# Patient Record
Sex: MALE | Race: WHITE | NOT HISPANIC OR LATINO | URBAN - METROPOLITAN AREA
[De-identification: names, ages, dates, MRNs, and addresses within clinical notes are randomized per-mention and may not be internally consistent; named-entity substitution may affect disease eponyms.]

---

## 2017-08-28 ENCOUNTER — OFFICE VISIT - HEALTHEAST (OUTPATIENT)
Dept: FAMILY MEDICINE | Facility: CLINIC | Age: 52
End: 2017-08-28

## 2017-08-28 DIAGNOSIS — J01.90 ACUTE SINUSITIS: ICD-10-CM

## 2017-11-02 ENCOUNTER — RECORDS - HEALTHEAST (OUTPATIENT)
Dept: ADMINISTRATIVE | Facility: OTHER | Age: 52
End: 2017-11-02

## 2017-11-07 ENCOUNTER — OFFICE VISIT - HEALTHEAST (OUTPATIENT)
Dept: INTERNAL MEDICINE | Facility: CLINIC | Age: 52
End: 2017-11-07

## 2017-11-07 ENCOUNTER — COMMUNICATION - HEALTHEAST (OUTPATIENT)
Dept: TELEHEALTH | Facility: CLINIC | Age: 52
End: 2017-11-07

## 2017-11-07 DIAGNOSIS — K51.90 ULCERATIVE COLITIS (H): ICD-10-CM

## 2017-11-07 DIAGNOSIS — F41.1 GENERALIZED ANXIETY DISORDER: ICD-10-CM

## 2017-11-07 DIAGNOSIS — I10 ESSENTIAL HYPERTENSION: ICD-10-CM

## 2017-11-07 DIAGNOSIS — Z12.5 SCREENING FOR PROSTATE CANCER: ICD-10-CM

## 2017-11-07 LAB — PSA SERPL-MCNC: 3.7 NG/ML (ref 0–3.5)

## 2017-11-07 ASSESSMENT — MIFFLIN-ST. JEOR: SCORE: 1762.82

## 2017-11-09 ENCOUNTER — RECORDS - HEALTHEAST (OUTPATIENT)
Dept: ADMINISTRATIVE | Facility: OTHER | Age: 52
End: 2017-11-09

## 2017-11-27 ENCOUNTER — COMMUNICATION - HEALTHEAST (OUTPATIENT)
Dept: INTERNAL MEDICINE | Facility: CLINIC | Age: 52
End: 2017-11-27

## 2017-11-30 ENCOUNTER — OFFICE VISIT - HEALTHEAST (OUTPATIENT)
Dept: INTERNAL MEDICINE | Facility: CLINIC | Age: 52
End: 2017-11-30

## 2017-11-30 DIAGNOSIS — F41.1 GENERALIZED ANXIETY DISORDER: ICD-10-CM

## 2017-11-30 RX ORDER — AMOXICILLIN 500 MG/1
500 CAPSULE ORAL PRN
Qty: 20 CAPSULE | Refills: 0 | Status: SHIPPED | OUTPATIENT
Start: 2017-11-30

## 2017-12-06 ENCOUNTER — COMMUNICATION - HEALTHEAST (OUTPATIENT)
Dept: INTERNAL MEDICINE | Facility: CLINIC | Age: 52
End: 2017-12-06

## 2017-12-08 ENCOUNTER — COMMUNICATION - HEALTHEAST (OUTPATIENT)
Dept: INTERNAL MEDICINE | Facility: CLINIC | Age: 52
End: 2017-12-08

## 2017-12-12 ENCOUNTER — OFFICE VISIT - HEALTHEAST (OUTPATIENT)
Dept: INTERNAL MEDICINE | Facility: CLINIC | Age: 52
End: 2017-12-12

## 2017-12-12 DIAGNOSIS — K51.90 ULCERATIVE COLITIS (H): ICD-10-CM

## 2017-12-12 DIAGNOSIS — F41.1 GENERALIZED ANXIETY DISORDER: ICD-10-CM

## 2017-12-12 RX ORDER — DILTIAZEM HYDROCHLORIDE 360 MG/1
360 CAPSULE, EXTENDED RELEASE ORAL DAILY
Qty: 90 CAPSULE | Refills: 3 | Status: SHIPPED | OUTPATIENT
Start: 2017-12-12

## 2017-12-12 RX ORDER — HYDROCHLOROTHIAZIDE 25 MG/1
25 TABLET ORAL DAILY
Qty: 90 TABLET | Refills: 3 | Status: SHIPPED | OUTPATIENT
Start: 2017-12-12

## 2017-12-15 ENCOUNTER — COMMUNICATION - HEALTHEAST (OUTPATIENT)
Dept: INTERNAL MEDICINE | Facility: CLINIC | Age: 52
End: 2017-12-15

## 2017-12-18 ENCOUNTER — COMMUNICATION - HEALTHEAST (OUTPATIENT)
Dept: INTERNAL MEDICINE | Facility: CLINIC | Age: 52
End: 2017-12-18

## 2018-01-04 ENCOUNTER — OFFICE VISIT - HEALTHEAST (OUTPATIENT)
Dept: INTERNAL MEDICINE | Facility: CLINIC | Age: 53
End: 2018-01-04

## 2018-01-04 DIAGNOSIS — F41.1 GENERALIZED ANXIETY DISORDER: ICD-10-CM

## 2018-01-08 ENCOUNTER — COMMUNICATION - HEALTHEAST (OUTPATIENT)
Dept: INTERNAL MEDICINE | Facility: CLINIC | Age: 53
End: 2018-01-08

## 2018-01-09 ENCOUNTER — COMMUNICATION - HEALTHEAST (OUTPATIENT)
Dept: INTERNAL MEDICINE | Facility: CLINIC | Age: 53
End: 2018-01-09

## 2018-01-23 ENCOUNTER — RECORDS - HEALTHEAST (OUTPATIENT)
Dept: ADMINISTRATIVE | Facility: OTHER | Age: 53
End: 2018-01-23

## 2018-02-27 ENCOUNTER — COMMUNICATION - HEALTHEAST (OUTPATIENT)
Dept: INTERNAL MEDICINE | Facility: CLINIC | Age: 53
End: 2018-02-27

## 2018-04-03 ENCOUNTER — COMMUNICATION - HEALTHEAST (OUTPATIENT)
Dept: INTERNAL MEDICINE | Facility: CLINIC | Age: 53
End: 2018-04-03

## 2018-04-09 ENCOUNTER — COMMUNICATION - HEALTHEAST (OUTPATIENT)
Dept: INTERNAL MEDICINE | Facility: CLINIC | Age: 53
End: 2018-04-09

## 2018-04-09 RX ORDER — SILDENAFIL CITRATE 20 MG/1
20-100 TABLET ORAL PRN
Qty: 90 TABLET | Refills: 0 | Status: SHIPPED | OUTPATIENT
Start: 2018-04-09

## 2018-05-21 ENCOUNTER — COMMUNICATION - HEALTHEAST (OUTPATIENT)
Dept: INTERNAL MEDICINE | Facility: CLINIC | Age: 53
End: 2018-05-21

## 2018-06-01 ENCOUNTER — COMMUNICATION - HEALTHEAST (OUTPATIENT)
Dept: INTERNAL MEDICINE | Facility: CLINIC | Age: 53
End: 2018-06-01

## 2018-06-01 DIAGNOSIS — F32.A DEPRESSION: ICD-10-CM

## 2018-06-01 DIAGNOSIS — I10 HTN (HYPERTENSION): ICD-10-CM

## 2018-06-03 RX ORDER — LOSARTAN POTASSIUM 50 MG/1
50 TABLET ORAL DAILY
Qty: 90 TABLET | Refills: 2 | Status: SHIPPED | OUTPATIENT
Start: 2018-06-03

## 2018-06-03 RX ORDER — BUPROPION HYDROCHLORIDE 300 MG/1
300 TABLET ORAL EVERY MORNING
Qty: 90 TABLET | Refills: 2 | Status: SHIPPED | OUTPATIENT
Start: 2018-06-03

## 2018-06-12 ENCOUNTER — OFFICE VISIT - HEALTHEAST (OUTPATIENT)
Dept: INTERNAL MEDICINE | Facility: CLINIC | Age: 53
End: 2018-06-12

## 2018-06-12 DIAGNOSIS — I10 ESSENTIAL HYPERTENSION: ICD-10-CM

## 2018-06-12 DIAGNOSIS — F41.1 GENERALIZED ANXIETY DISORDER: ICD-10-CM

## 2018-06-25 ENCOUNTER — COMMUNICATION - HEALTHEAST (OUTPATIENT)
Dept: INTERNAL MEDICINE | Facility: CLINIC | Age: 53
End: 2018-06-25

## 2018-06-25 DIAGNOSIS — K51.90 ULCERATIVE COLITIS (H): ICD-10-CM

## 2018-06-25 RX ORDER — MESALAMINE 1.2 G/1
1200 TABLET, DELAYED RELEASE ORAL 2 TIMES DAILY
Qty: 180 TABLET | Refills: 3 | Status: SHIPPED | OUTPATIENT
Start: 2018-06-25

## 2018-06-27 ENCOUNTER — COMMUNICATION - HEALTHEAST (OUTPATIENT)
Dept: INTERNAL MEDICINE | Facility: CLINIC | Age: 53
End: 2018-06-27

## 2018-06-28 ENCOUNTER — COMMUNICATION - HEALTHEAST (OUTPATIENT)
Dept: INTERNAL MEDICINE | Facility: CLINIC | Age: 53
End: 2018-06-28

## 2018-06-28 RX ORDER — CLONAZEPAM 1 MG/1
1 TABLET ORAL 3 TIMES DAILY PRN
Qty: 60 TABLET | Refills: 0 | Status: SHIPPED | OUTPATIENT
Start: 2018-06-28

## 2018-12-13 ENCOUNTER — COMMUNICATION - HEALTHEAST (OUTPATIENT)
Dept: ADMINISTRATIVE | Facility: CLINIC | Age: 53
End: 2018-12-13

## 2019-08-23 ENCOUNTER — COMMUNICATION - HEALTHEAST (OUTPATIENT)
Dept: INTERNAL MEDICINE | Facility: CLINIC | Age: 54
End: 2019-08-23

## 2021-05-31 VITALS — WEIGHT: 208 LBS | BODY MASS INDEX: 30.81 KG/M2 | HEIGHT: 69 IN

## 2021-05-31 VITALS — WEIGHT: 208 LBS | BODY MASS INDEX: 30.49 KG/M2

## 2021-05-31 VITALS — BODY MASS INDEX: 30.79 KG/M2 | WEIGHT: 210 LBS

## 2021-05-31 VITALS — WEIGHT: 211 LBS | BODY MASS INDEX: 30.93 KG/M2

## 2021-05-31 VITALS — WEIGHT: 216.2 LBS

## 2021-06-01 VITALS — BODY MASS INDEX: 29.47 KG/M2 | WEIGHT: 201 LBS

## 2021-06-12 NOTE — PROGRESS NOTES
Chief Complaint   Patient presents with     Sinus Problem     X 1 week, green mucus.      Headache       HPI    Patient is here for a week of green nasal discharge with sinus pain and intermittent headache. No cough, fever, chills.     ROS: Pertinent ROS noted in HPI.     No Known Allergies    There is no problem list on file for this patient.      No family history on file.    Social History     Social History     Marital status:      Spouse name: N/A     Number of children: N/A     Years of education: N/A     Occupational History     Not on file.     Social History Main Topics     Smoking status: Former Smoker     Smokeless tobacco: Never Used     Alcohol use Not on file     Drug use: Not on file     Sexual activity: Not on file     Other Topics Concern     Not on file     Social History Narrative     No narrative on file         Objective:    Vitals:    08/28/17 1324   BP: 130/76   Pulse: 86   Resp: 12   Temp: 98.8  F (37.1  C)   SpO2: 97%       Gen:NAD  Oropharynx: normal  Ears:normal TMs and canals  Nose: congested and boggy right nasal mucosa  Sinus: tenderness to percussion right maxillary sinus  CV: RRR, no M, R, G  Pulm: CTAB        Acute sinusitis  -     amoxicillin-clavulanate (AUGMENTIN) 875-125 mg per tablet; Take 1 tablet by mouth 2 (two) times a day for 10 days.

## 2021-06-14 NOTE — PROGRESS NOTES
Melchor comes in today for follow-up of his anxiety.  Since increasing his Wellbutrin to 300 mg per day he has had some increased incidence of headache, but he states that he is tolerating this and is going to try to work through it.  He is only taking 1-1.5 mg of clonazepam per day as he has been trying to wean down on this.  He states that his anxiety is under better control overall.  The situations in his life are are sorting themselves out finally and he is happy because of this.    Objective: Vital signs are as per the EMR.  In general the patient is alert pleasant and in no acute distress.  He appears healthy.    Plan: Anxiety, improving.  Continue current medications.  Follow-up as needed.

## 2021-06-14 NOTE — PROGRESS NOTES
Melchor comes in today for follow-up of his anxiety.  He is currently taking 150 mg of Wellbutrin along with 0.5 mg of clonazepam 3 times per day.  He is taking the clonazepam on a regular basis as he feels that his symptoms are not under good control currently.  He is going through a lot of life stressors including a custody hearing with his ex-wife and starting a new job in [place].  He also cares for a family member with Alzheimer's disease at home as well.  He denies any chest pain, palpitations, or shortness of breath.    Objective: Vital signs are as per the EMR.  In general the patient is alert pleasant and in no acute distress.  He appears healthy.    Assessment and plan: Anxiety, under controlled.  Increase bupropion to 300 mg per day, and increase clonazepam to 1 mg 3 times per day as needed.  I advised him to be judicious regarding his clonazepam use.  He will follow-up in one month.

## 2021-06-14 NOTE — PROGRESS NOTES
Melchor comes in today to establish care.  Please see the physical exam forms a and B for further details, including a complete review of systems.    ILLNESSES, HOSPITALIZATIONS, AND OPERATIONS:    #1.  Anxiety, currently on bupropion and clonazepam.  2.  Hypertension.  3.  Impaired fasting glucose.  4.  Obstructive sleep apnea.  5.  Ulcerative colitis, on mesalamine.  6.  Status post left total hip arthroplasty in 2016.    Melchor just moved to the area about 3 months ago.  He was working in [place] for about the last 30 years but recently got a job at [employer].  He was originally born in [place] and went [employer] before he got a job with [employer].  Past medical and surgical history were reviewed.  Allergies and medications were reviewed.  He works in [employer].  He is unfortunately going through a divorce and has 3 children, all high school age.  They still live in [place].  Is a non-smoker.    OBJECTIVE:    In general the patient is alert pleasant and in no acute distress.    HEENT: Pupils equal round reactive light.  Oropharynx is clear.  Lymphatic shows no anterior posterior cervical lymphadenopathy.  No thyromegaly or other masses noted.    Cardiovascular: S1-S2 regular in rhythm no murmurs gallops rubs    Lungs are clear    Assessment and plan: Patient presenting to establish care as well as to review his medical problems.  I am going to check a CMP, CBC, and PSA.  He is referred to gastroenterology for his UC.  He also desires a vasectomy and we referred him to urology.  Follow-up in 6 months, earlier if needed.

## 2021-06-15 NOTE — PROGRESS NOTES
Melchor comes in today for follow-up of his anxiety and his erectile dysfunction.  He is currently taking 1-2 tablets of clonazepam per day along with his Wellbutrin.  He states that this is controlling his symptoms well.  Blood pressure is under good control today.  He is looking for a a refill on his sildenafil.  He has an appointment with the gastroenterologist next month and also has an appointment for a vasectomy as well.    Objective: Vital signs are as per the EMR.  General patient is alert pleasant and in no acute distress.  He appears healthy.    Assessment and plan: Anxiety, controlled.  Continue current medications.  Viagra was refilled.  Follow-up in 6 months.

## 2021-06-18 NOTE — PROGRESS NOTES
Melchor comes in today for a med check.  Blood pressure is under excellent control on hydrochlorothiazide and losartan.  He is feeling physically well and denies any chest pain or headaches.  His anxiety is under fairly good control with the Wellbutrin and his clonazepam, but unfortunately he was informed recently that his job is going to be eliminated at [employer] next month and he is currently in the midst of a job search.  His anxiety fortunately has not worsened because of this.  He is otherwise feeling well today.  He has seen Dr. Boyd at [place] [facility] for his ulcerative colitis in January.  This was stable at this time.    Objective: Vital signs are as per the EMR.  In general the patient is alert pleasant and in no acute distress.  He appears healthy.    Assessment and plan:    1.  Hypertension, controlled.  Renal function was excellent in November 2017.  He will follow-up in 5-6 months.  2.  Anxiety, controlled.  3.  Ulcerative colitis, controlled.  I told him that if he needs refills of his mesalamine he should contact his gastroenterologist.  However in an emergency we could conceivably give him a month supply here.

## 2021-06-19 NOTE — LETTER
Letter by Tawanda England MD at      Author: Tawanda England MD Service: -- Author Type: --    Filed:  Encounter Date: 8/23/2019 Status: (Other)         Melchor Gay  [id number] [id number]             August 23, 2019         Dear Mr. Gay,    I just wanted to send a friendly reminder that you are due for your annual depression follow up.  These appointments need to be done yearly and make sure there are not interruptions with medication refills as well.  Please use my chart or call the clinic at [phone] and schedule an appointment with your provider.    Please call with questions or contact us using [employer].    Sincerely,        Electronically signed by Tawanda England MD

## 2021-08-15 ENCOUNTER — HEALTH MAINTENANCE LETTER (OUTPATIENT)
Age: 56
End: 2021-08-15

## 2021-10-11 ENCOUNTER — HEALTH MAINTENANCE LETTER (OUTPATIENT)
Age: 56
End: 2021-10-11

## 2022-09-25 ENCOUNTER — HEALTH MAINTENANCE LETTER (OUTPATIENT)
Age: 57
End: 2022-09-25

## 2023-10-14 ENCOUNTER — HEALTH MAINTENANCE LETTER (OUTPATIENT)
Age: 58
End: 2023-10-14